# Patient Record
Sex: FEMALE | Race: WHITE
[De-identification: names, ages, dates, MRNs, and addresses within clinical notes are randomized per-mention and may not be internally consistent; named-entity substitution may affect disease eponyms.]

---

## 2017-01-02 ENCOUNTER — HOSPITAL ENCOUNTER (OUTPATIENT)
Dept: HOSPITAL 45 - C.MAMM | Age: 52
Discharge: HOME | End: 2017-01-02
Attending: OBSTETRICS & GYNECOLOGY
Payer: COMMERCIAL

## 2017-01-02 DIAGNOSIS — Z12.31: Primary | ICD-10-CM

## 2017-01-03 NOTE — MAMMOGRAPHY REPORT
BILATERAL DIGITAL SCREENING MAMMOGRAM TOMOSYNTHESIS WITH CAD: 1/2/2017

CLINICAL HISTORY: Routine screening.  Patient has no complaints.  





TECHNIQUE:  Breast tomosynthesis in addition to standard 2D mammography was performed. Current study
 was also evaluated with a Computer Aided Detection (CAD) system.  



COMPARISON: Comparison is made to exams dated:  11/10/2015 mammogram, 11/5/2014 mammogram, 6/4/2014 
mammogram - [facility], 7/22/2009, 7/28/2010 mammogram, and 8/3/2011 mammogram - [facility].   



BREAST COMPOSITION:  The tissue of both breasts is extremely dense, which lowers the sensitivity of 
mammography.  



FINDINGS: There are mild vascular calcifications and scattered stable round and punctate microcalcif
ications bilaterally.  No obvious new mass, architectural distortion or cluster of microcalcificatio
ns is seen.  



IMPRESSION:  ACR BI-RADS CATEGORY 1: NEGATIVE

There is no mammographic evidence of malignancy. A 1 year screening mammogram is recommended.  The p
atient will receive written notification of the results.  





Approximately 10% of breast cancers are not detected with mammography. A negative mammographic repor
t should not delay biopsy if a clinically suggestive mass is present.



Evelina Clark M.D.          

ay/:1/2/2017 16:15:31  



Imaging Technologist: Rebeca RAIN)(JORGE)(BD), [facility]

letter sent: Normal 1/2  

BI-RADS Code: ACR BI-RADS Category 1: Negative

## 2017-03-28 ENCOUNTER — HOSPITAL ENCOUNTER (OUTPATIENT)
Dept: HOSPITAL 45 - C.GI | Age: 52
Discharge: HOME | End: 2017-03-28
Attending: INTERNAL MEDICINE
Payer: COMMERCIAL

## 2017-03-28 VITALS
HEART RATE: 63 BPM | DIASTOLIC BLOOD PRESSURE: 76 MMHG | SYSTOLIC BLOOD PRESSURE: 105 MMHG | OXYGEN SATURATION: 100 % | TEMPERATURE: 97.34 F

## 2017-03-28 VITALS
HEIGHT: 67.99 IN | BODY MASS INDEX: 18.23 KG/M2 | HEIGHT: 67.99 IN | BODY MASS INDEX: 18.23 KG/M2 | WEIGHT: 120.26 LBS | WEIGHT: 120.26 LBS | BODY MASS INDEX: 18.23 KG/M2

## 2017-03-28 DIAGNOSIS — R10.9: ICD-10-CM

## 2017-03-28 DIAGNOSIS — R14.0: Primary | ICD-10-CM

## 2017-03-29 NOTE — OPERATIVE REPORT
DATE OF OPERATION:  03/28/2017

 

PROCEDURE:  Hydrogen breath test for small bowel bacterial overgrowth.

 

INDICATIONS:  The patient is complaining of abdominal pain and bloating.

 

DESCRIPTION OF PROCEDURE:  The patient ingested 10 grams of lactulose and

breath tests were performed at 20 minute intervals for 3 hours.  The

patient's baseline hydrogen was 4 during the entire 3-hour monitor.  She

experienced no symptoms and the hydrogen got was 6 at 160 minutes and ranged

from 3-6 during this time.  These results indicate that she does not have

small bowel bacterial overgrowth.

 

IMPRESSION:  Negative breath test for small bowel bacterial overgrowth.

 

 

I attest to the content of the Intraoperative Record and any orders documented therein. Any exceptio
ns are noted below.

## 2017-04-17 ENCOUNTER — HOSPITAL ENCOUNTER (OUTPATIENT)
Dept: HOSPITAL 45 - C.ULTR | Age: 52
Discharge: HOME | End: 2017-04-17
Attending: SPECIALIST
Payer: COMMERCIAL

## 2017-04-17 DIAGNOSIS — R59.1: Primary | ICD-10-CM

## 2017-04-17 NOTE — DIAGNOSTIC IMAGING REPORT
ABDOMINAL ULTRASOUND COMPLETE



HISTORY: No pathology  GENERALIZED ENLARGED LYMPH NODES.



COMPARISON:  12/5/2016



FINDINGS:



Pancreas: The pancreas demonstrates a normal echotexture.



Liver: Unchanging slightly hypoechoic masses produces described is potential

focal nodular hyperplasia.



Gallbladder: No gallbladder wall thickening. No gallstones.



CBD: 3 mm



Kidneys: No hydronephrosis.



Spleen: Normal in size.



Aorta: Normal in caliber.



IVC: Patent.



IMPRESSION: 

Stable evaluation of the abdomen.





2. Unchanging nodularity of the liver.





3. No new or interval process.







Electronically signed by:  Ander Duong M.D.

4/17/2017 9:55 AM



Dictated Date/Time:  4/17/2017 9:51 AM

## 2017-05-17 ENCOUNTER — HOSPITAL ENCOUNTER (OUTPATIENT)
Dept: HOSPITAL 45 - C.LAB | Age: 52
Discharge: HOME | End: 2017-05-17
Attending: SPECIALIST
Payer: COMMERCIAL

## 2017-05-17 DIAGNOSIS — D50.8: Primary | ICD-10-CM

## 2017-05-17 DIAGNOSIS — N94.9: ICD-10-CM

## 2017-05-17 LAB
BASOPHILS # BLD: 0.03 K/UL (ref 0–0.2)
BASOPHILS NFR BLD: 0.6 %
CALCIUM SERPL-MCNC: 9.3 MG/DL (ref 8.5–10.1)
COMPLETE: YES
EOSINOPHIL NFR BLD AUTO: 186 K/UL (ref 130–400)
FERRITIN SERPL-MCNC: 187.5 NG/ML (ref 8–388)
HCT VFR BLD CALC: 37 % (ref 37–47)
IG%: 0 %
IMM GRANULOCYTES NFR BLD AUTO: 28.5 %
LYMPHOCYTES # BLD: 1.47 K/UL (ref 1.2–3.4)
MCH RBC QN AUTO: 28.8 PG (ref 25–34)
MCHC RBC AUTO-ENTMCNC: 33 G/DL (ref 32–36)
MCV RBC AUTO: 87.3 FL (ref 80–100)
MONOCYTES NFR BLD: 8.7 %
NEUTROPHILS # BLD AUTO: 2.1 %
NEUTROPHILS NFR BLD AUTO: 60.1 %
PMV BLD AUTO: 9.4 FL (ref 7.4–10.4)
RBC # BLD AUTO: 4.24 M/UL (ref 4.2–5.4)
TSH SERPL-ACNC: 1.06 UIU/ML (ref 0.3–4.5)
WBC # BLD AUTO: 5.16 K/UL (ref 4.8–10.8)

## 2017-05-22 ENCOUNTER — HOSPITAL ENCOUNTER (OUTPATIENT)
Dept: HOSPITAL 45 - C.ULTR | Age: 52
Discharge: HOME | End: 2017-05-22
Attending: FAMILY MEDICINE
Payer: COMMERCIAL

## 2017-05-22 DIAGNOSIS — E04.1: Primary | ICD-10-CM

## 2017-05-22 NOTE — DIAGNOSTIC IMAGING REPORT
THYROID ULTRASOUND 



CLINICAL HISTORY: Thyroid nodule.    



COMPARISON STUDY:  Thyroid ultrasound November 15, 2016.



TECHNIQUE: Sonography of the thyroid gland was performed.



FINDINGS: The right thyroid lobe measures 4.9 x 1.3 x 1.5 cm and the left lobe

measures 4.8 x 1.1 x 1.3 cm. There are few small cystic lesions within the

thyroid gland which measure up to 3 mm. Several these have echogenic foci with

comet tail artifact consistent with colloid cysts. The small lesion shown on

exam of November 15, 2016 is similar to prior study. In retrospect, the small

lesions shown on today's exam were likely present on prior study.



IMPRESSION: Several tiny colloid cysts within the thyroid gland. No suspicious

thyroid nodules by sonography.







Electronically signed by:  Khari Gardner M.D.

5/22/2017 9:01 AM



Dictated Date/Time:  5/22/2017 8:58 AM

## 2017-11-13 ENCOUNTER — HOSPITAL ENCOUNTER (OUTPATIENT)
Dept: HOSPITAL 45 - C.LAB1850 | Age: 52
Discharge: HOME | End: 2017-11-13
Attending: OBSTETRICS & GYNECOLOGY
Payer: COMMERCIAL

## 2017-11-13 DIAGNOSIS — R53.83: Primary | ICD-10-CM

## 2017-11-13 LAB — TSH SERPL-ACNC: 1.07 UIU/ML (ref 0.3–4.5)

## 2018-04-03 ENCOUNTER — HOSPITAL ENCOUNTER (OUTPATIENT)
Dept: HOSPITAL 45 - C.MAMM | Age: 53
Discharge: HOME | End: 2018-04-03
Attending: OBSTETRICS & GYNECOLOGY
Payer: COMMERCIAL

## 2018-04-03 DIAGNOSIS — Z12.31: Primary | ICD-10-CM

## 2018-04-04 NOTE — MAMMOGRAPHY REPORT
BILATERAL DIGITAL SCREENING MAMMOGRAM TOMOSYNTHESIS WITH CAD: 4/3/2018

CLINICAL HISTORY: Routine screening.  





TECHNIQUE:  Breast tomosynthesis in addition to standard 2D mammography was performed. Current study 
was also evaluated with a Computer Aided Detection (CAD) system.  



COMPARISON: Comparison is made to exams dated:  1/2/2017 mammogram, 11/10/2015 mammogram, 11/5/2014 m
ammogram, 10/30/2013 mammogram, 10/17/2012 mammogram, and 8/3/2011 mammogram - [facility].   



BREAST COMPOSITION:  The tissue of both breasts is extremely dense, which lowers the sensitivity of m
ammography.  



FINDINGS: There are stable round and punctate microcalcifications in the right breast.  No obvious ne
w mass, architectural distortion or cluster of suspicious microcalcifications is seen.  



IMPRESSION:  ACR BI-RADS CATEGORY 1: NEGATIVE

There is no mammographic evidence of malignancy. A 1 year screening mammogram is recommended.  The pa
tient will receive written notification of the results.  





Approximately 10% of breast cancers are not detected with mammography. A negative mammographic report
 should not delay biopsy if a clinically suggestive mass is present.



Evelina Clark M.D.          

ay/:4/3/2018 17:38:48  



Imaging Technologist: Ashely DE OLIVEIRA(R)(M), [facility]

letter sent: Normal 1/2  

BI-RADS Code: ACR BI-RADS Category 1: Negative

## 2018-04-13 ENCOUNTER — HOSPITAL ENCOUNTER (OUTPATIENT)
Dept: HOSPITAL 45 - C.PAPS | Age: 53
Discharge: HOME | End: 2018-04-13
Attending: OBSTETRICS & GYNECOLOGY
Payer: COMMERCIAL

## 2018-04-13 DIAGNOSIS — Z01.419: Primary | ICD-10-CM

## 2018-04-17 ENCOUNTER — HOSPITAL ENCOUNTER (OUTPATIENT)
Dept: HOSPITAL 45 - C.MAMM | Age: 53
Discharge: HOME | End: 2018-04-17
Attending: OBSTETRICS & GYNECOLOGY
Payer: COMMERCIAL

## 2018-04-17 DIAGNOSIS — N63.10: Primary | ICD-10-CM

## 2018-04-18 NOTE — MAMMOGRAPHY REPORT
ULTRASOUND OF BOTH BREASTS: 4/17/2018

CLINICAL HISTORY: 53-year-old woman presents for diagnostic ultrasound.  On recent physical exam her 
physician felt a smooth mobile mass in the 7:00 to 8:00 right breast.  She recently had a benign (BI-
RADS 2) screening mammogram performed on 4/3/2018.  





COMPARISON: Comparison is made to exams dated:  4/3/2018 mammogram, 1/2/2017 mammogram, 11/10/2015 ma
mmogram, 11/5/2014 mammogram, 10/30/2013 mammogram, and 10/17/2012 mammogram - [facility].   



FINDINGS: Targeted ultrasound was performed in the lower outer quadrant of the right breast.  On palp
ation, there are smooth ovoid nodular areas identified in the 6:00 and targeted ultrasound performed 
directly 8:00 right breast.  Over the nodular areas demonstrates normal dense glandular tissue.  Ther
e is no evidence of a suspicious solid or cystic mass, focal skin thickening or drainable fluid colle
ction.



IMPRESSION: ACR BI-RADS CATEGORY 2: BENIGN 

There is no targeted sonographic evidence of malignancy or other suspicious abnormality to explain th
e smooth ovoid nodular areas in the right lower outer quadrant, initially identified by the patient's
 physician.  Re-review of the most recent screening mammogram performed 4/3/2018 does not demonstrate
 any obvious mass, focal area of distortion, asymmetry or new calcifications, with particular attenti
on to the right lower outer quadrant.  Therefore, continued clinical follow-up is recommended and it 
should be noted that biopsy of a clinically suspicious mass should not be precluded by negative imagi
ng.  



These results and recommendations were discussed with the patient at the time of the exam.







Evelina Clark M.D.  

ay/:4/17/2018 13:51:35  



Attending Technologist: Ashely Nieves RT(R)(M), [facility]

Imaging Technologist: Dr. Evelina Clark, [facility]

letter sent: Normal 1/2  

BI-RADS Code: ACR BI-RADS Category 2: Benign